# Patient Record
Sex: FEMALE | Race: WHITE | NOT HISPANIC OR LATINO | URBAN - METROPOLITAN AREA
[De-identification: names, ages, dates, MRNs, and addresses within clinical notes are randomized per-mention and may not be internally consistent; named-entity substitution may affect disease eponyms.]

---

## 2019-01-21 ENCOUNTER — EMERGENCY (EMERGENCY)
Facility: HOSPITAL | Age: 29
LOS: 1 days | Discharge: HOME | End: 2019-01-21
Attending: EMERGENCY MEDICINE
Payer: COMMERCIAL

## 2019-01-21 VITALS
OXYGEN SATURATION: 99 % | HEART RATE: 84 BPM | RESPIRATION RATE: 18 BRPM | DIASTOLIC BLOOD PRESSURE: 87 MMHG | TEMPERATURE: 98 F | SYSTOLIC BLOOD PRESSURE: 145 MMHG

## 2019-01-21 VITALS — WEIGHT: 188.05 LBS | HEIGHT: 72 IN

## 2019-01-21 PROCEDURE — 99284 EMERGENCY DEPT VISIT MOD MDM: CPT | Mod: 25

## 2019-01-21 PROCEDURE — 99284 EMERGENCY DEPT VISIT MOD MDM: CPT

## 2019-01-21 PROCEDURE — 70450 CT HEAD/BRAIN W/O DYE: CPT | Mod: 26

## 2019-01-21 PROCEDURE — 70450 CT HEAD/BRAIN W/O DYE: CPT

## 2019-01-21 RX ORDER — ACETAMINOPHEN 500 MG
650 TABLET ORAL ONCE
Qty: 0 | Refills: 0 | Status: COMPLETED | OUTPATIENT
Start: 2019-01-21 | End: 2019-01-21

## 2019-01-21 RX ADMIN — Medication 650 MILLIGRAM(S): at 19:18

## 2019-01-21 RX ADMIN — Medication 650 MILLIGRAM(S): at 18:41

## 2019-01-21 NOTE — ED ADULT NURSE NOTE - NSIMPLEMENTINTERV_GEN_ALL_ED
Implemented All Universal Safety Interventions:  [place] to call system. Call bell, personal items and telephone within reach. Instruct patient to call for assistance. Room bathroom lighting operational. Non-slip footwear when patient is off stretcher. Physically safe environment: no spills, clutter or unnecessary equipment. Stretcher in lowest position, wheels locked, appropriate side rails in place.
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
## 2019-01-21 NOTE — ED PROVIDER NOTE - PHYSICAL EXAMINATION
Afebrile, hemodynamically stable, saturating well  NAD, well appearing  Head NCAT  EOMI grossly, anicteric  MMM  No JVD  RRR, nml S1/S2, no m/r/g  Lungs CTAB, no w/r/r  Abd soft, NT, ND, nml BS, no rebound or guarding  AAO, CN's 3-12 grossly intact  No CTLS tenderness. Motor 5/5 and sensation intact in all extremities.  GOLDBERG spontaneously, no leg cyanosis or edema  Skin warm, well perfused, no rashes or hives Afebrile, hemodynamically stable, saturating well  NAD, well appearing  Head NCAT. No hemotympanum.   EOMI grossly, anicteric  MMM  No JVD  RRR, nml S1/S2, no m/r/g  Lungs CTAB, no w/r/r  Abd soft, NT, ND, nml BS, no rebound or guarding  AAO, CN's 3-12 grossly intact  No CTLS tenderness. Motor 5/5 and sensation intact in all extremities.  GOLDBERG spontaneously, no leg cyanosis or edema  Skin warm, well perfused, no rashes or hives Afebrile, hemodynamically stable, saturating well  NAD, well appearing  Head NCAT  EOMI grossly, anicteric  MMM, no hemotympanum  RRR, nml S1/S2, no m/r/g  Lungs CTAB, no w/r/r  Abd soft, NT, ND, nml BS, no rebound or guarding  AAO, CN's 3-12 grossly intact  No CTLS tenderness. Motor 5/5 and sensation intact in all extremities. Nml gait  GOLDBERG spontaneously, no leg cyanosis or edema  Skin warm, well perfused, no rashes or hives

## 2019-01-24 PROBLEM — Z00.00 ENCOUNTER FOR PREVENTIVE HEALTH EXAMINATION: Status: ACTIVE | Noted: 2019-01-24

## 2019-02-01 ENCOUNTER — APPOINTMENT (OUTPATIENT)
Dept: NEUROSURGERY | Facility: CLINIC | Age: 29
End: 2019-02-01

## 2019-02-25 ENCOUNTER — APPOINTMENT (OUTPATIENT)
Dept: NEUROLOGY | Facility: CLINIC | Age: 29
End: 2019-02-25

## 2019-03-04 ENCOUNTER — APPOINTMENT (OUTPATIENT)
Dept: PHYSICAL MEDICINE AND REHAB | Facility: CLINIC | Age: 29
End: 2019-03-04

## 2019-03-14 ENCOUNTER — APPOINTMENT (OUTPATIENT)
Dept: PHYSICAL MEDICINE AND REHAB | Facility: CLINIC | Age: 29
End: 2019-03-14

## 2019-03-15 ENCOUNTER — APPOINTMENT (OUTPATIENT)
Dept: PHYSICAL MEDICINE AND REHAB | Facility: CLINIC | Age: 29
End: 2019-03-15
Payer: COMMERCIAL

## 2019-03-15 VITALS
WEIGHT: 201 LBS | BODY MASS INDEX: 27.22 KG/M2 | DIASTOLIC BLOOD PRESSURE: 89 MMHG | HEART RATE: 82 BPM | OXYGEN SATURATION: 97 % | RESPIRATION RATE: 16 BRPM | SYSTOLIC BLOOD PRESSURE: 126 MMHG | HEIGHT: 72 IN

## 2019-03-15 DIAGNOSIS — S06.0X9A CONCUSSION WITH LOSS OF CONSCIOUSNESS OF UNSPECIFIED DURATION, INITIAL ENCOUNTER: ICD-10-CM

## 2019-03-15 DIAGNOSIS — Z80.3 FAMILY HISTORY OF MALIGNANT NEOPLASM OF BREAST: ICD-10-CM

## 2019-03-15 DIAGNOSIS — Z78.9 OTHER SPECIFIED HEALTH STATUS: ICD-10-CM

## 2019-03-15 DIAGNOSIS — H53.9 UNSPECIFIED VISUAL DISTURBANCE: ICD-10-CM

## 2019-03-15 DIAGNOSIS — H43.399 OTHER VITREOUS OPACITIES, UNSPECIFIED EYE: ICD-10-CM

## 2019-03-15 DIAGNOSIS — H53.19 OTHER SUBJECTIVE VISUAL DISTURBANCES: ICD-10-CM

## 2019-03-15 PROCEDURE — 99204 OFFICE O/P NEW MOD 45 MIN: CPT

## 2019-03-15 NOTE — PHYSICAL EXAM
[FreeTextEntry1] : PE: Pleasant O x 3 follows 1-2 step commands\par \par ext:\par no facial swelling or bruising\par eyes clear, EOMI\par no facial droop\par no cervical PS or trap spasm, tightness\par cervical ROM: FF 25 ext 20 \par right lateral flexion 25 left 25\par lateral rotation WFL \par negative Spurling's no pain with ROM\par \par bilateral Ue nad LE normal tone and ROM\par no pain with ROM\par motor 5/5 [place] shoulder, elbow flexion/extension, gross grasp\par bilateral Hf, quad ham 5/5 ankle PF and DF 5/5\par \par neuro: negative Romberg\par SLS 10 seconds bilaterally\par tandem gait good\par \par orientation: 5/5\par memory: 5/5 immediate recall 4/5 delayed 5 min (5/5 with cue) 5/5 after 20 min\par concentration: digits revers 3/4 (missed # on 6 span) months of year 100% reverse
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
## 2019-03-15 NOTE — SOCIAL HISTORY
[Apartment] : in an apartment [Stairs to enter?] : stairs to enter [Stairs inside the home?] : stairs inside the home [No Device Needed] : Patient doesn't use a device for ambulation [de-identified] : lives alone [FreeTextEntry1] : Patient lives in coop, stairs, 2 flights, [employer]ep[employer] PTA [FreeTextEntry6] : no difficulty with iADLs including bills, grocery shopping etc
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
## 2019-03-15 NOTE — SOCIAL HISTORY
[Apartment] : in an apartment [Stairs to enter?] : stairs to enter [Stairs inside the home?] : stairs inside the home [No Device Needed] : Patient doesn't use a device for ambulation [de-identified] : lives alone [FreeTextEntry1] : Patient lives in coop, stairs, 2 flights, [employer]ep[employer] PTA [FreeTextEntry6] : no difficulty with iADLs including bills, grocery shopping etc

## 2019-06-03 ENCOUNTER — APPOINTMENT (OUTPATIENT)
Dept: PHYSICAL MEDICINE AND REHAB | Facility: CLINIC | Age: 29
End: 2019-06-03

## 2019-10-09 ENCOUNTER — APPOINTMENT (OUTPATIENT)
Dept: ORTHOPEDIC SURGERY | Facility: CLINIC | Age: 29
End: 2019-10-09
Payer: COMMERCIAL

## 2019-10-09 DIAGNOSIS — M94.269 CHONDROMALACIA, UNSPECIFIED KNEE: ICD-10-CM

## 2019-10-09 PROCEDURE — 73562 X-RAY EXAM OF KNEE 3: CPT | Mod: 50

## 2019-10-09 PROCEDURE — 99203 OFFICE O/P NEW LOW 30 MIN: CPT

## 2019-10-09 NOTE — PHYSICAL EXAM
[de-identified] : X-ray bilateral knee. There is no significant bony abnormality arthritis or fracture Ellen Saeed  (RN)  2018 15:22:55 [de-identified] : Bilateral knee\par \par Constitutional: \par The patient is healthy-appearing and in no apparent distress. \par \par Gait:\par The patient ambulates with a normal gait and no limp.\par \par Cardiovascular System: \par The capillary refill is less than 2 seconds. \par \par Skin: \par There are no skin abnormalities.\par \par Bilateral Knee: \par \par Bony Palpation: \par There is no tenderness of the medial joint line. \par There is no tenderness of the lateral joint line.\par There is no tenderness of the medial femoral chondyle.\par There is no tenderness of the lateral femoral chondyle.\par There is no tenderness of the tibial tubercle.\par There is no tenderness of the superior patella.\par There is no tenderness of the inferior patella.\par There is tenderness of the medial patellar facet.\par There is tenderness of the lateral patellar facet.\par \par Soft Tissue Palpation: \par There is no tenderness of the medial retinaculum.\par There is no tenderness of the lateral retinaculum.\par There is no tenderness of the quadriceps tendon.\par There is no tenderness of the patella tendon.\par There is no tenderness of the ITB.\par There is no tenderness of the pes anserine.\par \par Active Range of Motion: \par The range of motion at the knee actively and passively is full. \par \par Special Tests: \par There is a negative Apley.\par There is a negative Steinmanns. \par There is a negative Lachman and Anterior Drawer.\par There is a negative Posterior Drawer.  \par There is no varus or valgus laxity.\par \par Strength: \par There is 4/5 hip flexion and 5/5 knee flexion and extension.  \par \par Psychiatric: \par The patient demonstrates a normal mood and affect and is active and alert.\par .

## 2019-10-09 NOTE — HISTORY OF PRESENT ILLNESS
[de-identified] : Patient reports pain in BOTH knees for about 10 years now. Aching pain. Pain with being the knees, squatting and using stairs. Previous history of tendonitis in both knees. Patient is an avid racer/runner. Occasional stiffness.

## 2019-10-09 NOTE — ASSESSMENT
[FreeTextEntry1] : Discussed with patient exam. Recommendation for home exercises. Offered physical therapy but patient declines at this time. Patient to follow up in 6-8 weeks if no improvement

## 2020-04-14 ENCOUNTER — TRANSCRIPTION ENCOUNTER (OUTPATIENT)
Age: 30
End: 2020-04-14

## 2022-12-06 ENCOUNTER — OUTPATIENT (OUTPATIENT)
Dept: OUTPATIENT SERVICES | Facility: HOSPITAL | Age: 32
LOS: 1 days | Discharge: HOME | End: 2022-12-06

## 2022-12-06 DIAGNOSIS — E83.119 HEMOCHROMATOSIS, UNSPECIFIED: ICD-10-CM

## 2022-12-17 ENCOUNTER — NON-APPOINTMENT (OUTPATIENT)
Age: 32
End: 2022-12-17

## 2023-01-17 ENCOUNTER — NON-APPOINTMENT (OUTPATIENT)
Age: 33
End: 2023-01-17

## 2023-01-17 ENCOUNTER — RESULT REVIEW (OUTPATIENT)
Age: 33
End: 2023-01-17

## 2023-01-17 ENCOUNTER — APPOINTMENT (OUTPATIENT)
Dept: HEMATOLOGY ONCOLOGY | Facility: CLINIC | Age: 33
End: 2023-01-17
Payer: COMMERCIAL

## 2023-01-17 ENCOUNTER — APPOINTMENT (OUTPATIENT)
Dept: HEMATOLOGY ONCOLOGY | Facility: CLINIC | Age: 33
End: 2023-01-17

## 2023-01-17 VITALS
TEMPERATURE: 97.7 F | HEART RATE: 78 BPM | RESPIRATION RATE: 16 BRPM | BODY MASS INDEX: 27.32 KG/M2 | SYSTOLIC BLOOD PRESSURE: 129 MMHG | OXYGEN SATURATION: 99 % | HEIGHT: 72 IN | DIASTOLIC BLOOD PRESSURE: 88 MMHG | WEIGHT: 201.72 LBS

## 2023-01-17 DIAGNOSIS — R79.0 ABNORMAL LVL OF BLOOD MINERAL: ICD-10-CM

## 2023-01-17 LAB
BASOPHILS # BLD AUTO: 0.02 K/UL — SIGNIFICANT CHANGE UP (ref 0–0.2)
BASOPHILS NFR BLD AUTO: 0.3 % — SIGNIFICANT CHANGE UP (ref 0–2)
EOSINOPHIL # BLD AUTO: 0.08 K/UL — SIGNIFICANT CHANGE UP (ref 0–0.5)
EOSINOPHIL NFR BLD AUTO: 1.3 % — SIGNIFICANT CHANGE UP (ref 0–6)
HCT VFR BLD CALC: 41.6 % — SIGNIFICANT CHANGE UP (ref 34.5–45)
HGB BLD-MCNC: 13.7 G/DL — SIGNIFICANT CHANGE UP (ref 11.5–15.5)
IMM GRANULOCYTES NFR BLD AUTO: 0.2 % — SIGNIFICANT CHANGE UP (ref 0–0.9)
LYMPHOCYTES # BLD AUTO: 1.84 K/UL — SIGNIFICANT CHANGE UP (ref 1–3.3)
LYMPHOCYTES # BLD AUTO: 30.1 % — SIGNIFICANT CHANGE UP (ref 13–44)
MCHC RBC-ENTMCNC: 31.3 PG — SIGNIFICANT CHANGE UP (ref 27–34)
MCHC RBC-ENTMCNC: 32.9 G/DL — SIGNIFICANT CHANGE UP (ref 32–36)
MCV RBC AUTO: 95 FL — SIGNIFICANT CHANGE UP (ref 80–100)
MONOCYTES # BLD AUTO: 0.58 K/UL — SIGNIFICANT CHANGE UP (ref 0–0.9)
MONOCYTES NFR BLD AUTO: 9.5 % — SIGNIFICANT CHANGE UP (ref 2–14)
NEUTROPHILS # BLD AUTO: 3.58 K/UL — SIGNIFICANT CHANGE UP (ref 1.8–7.4)
NEUTROPHILS NFR BLD AUTO: 58.6 % — SIGNIFICANT CHANGE UP (ref 43–77)
NRBC # BLD: 0 /100 WBCS — SIGNIFICANT CHANGE UP (ref 0–0)
PLATELET # BLD AUTO: 219 K/UL — SIGNIFICANT CHANGE UP (ref 150–400)
RBC # BLD: 4.38 M/UL — SIGNIFICANT CHANGE UP (ref 3.8–5.2)
RBC # FLD: 13.2 % — SIGNIFICANT CHANGE UP (ref 10.3–14.5)
RETICS #: 62.6 K/UL — SIGNIFICANT CHANGE UP (ref 25–125)
RETICS/RBC NFR: 1.4 % — SIGNIFICANT CHANGE UP (ref 0.5–2.5)
WBC # BLD: 6.11 K/UL — SIGNIFICANT CHANGE UP (ref 3.8–10.5)
WBC # FLD AUTO: 6.11 K/UL — SIGNIFICANT CHANGE UP (ref 3.8–10.5)

## 2023-01-17 PROCEDURE — 99204 OFFICE O/P NEW MOD 45 MIN: CPT

## 2023-01-17 RX ORDER — ETONOGESTREL 68 MG/1
IMPLANT SUBCUTANEOUS
Refills: 0 | Status: ACTIVE | COMMUNITY

## 2023-01-23 LAB
ALBUMIN SERPL ELPH-MCNC: 4.4 G/DL
ALP BLD-CCNC: 56 U/L
ALT SERPL-CCNC: 26 U/L
ANION GAP SERPL CALC-SCNC: 10 MMOL/L
AST SERPL-CCNC: 27 U/L
BILIRUB SERPL-MCNC: 0.7 MG/DL
BUN SERPL-MCNC: 19 MG/DL
CALCIUM SERPL-MCNC: 9.6 MG/DL
CHLORIDE SERPL-SCNC: 103 MMOL/L
CO2 SERPL-SCNC: 26 MMOL/L
CREAT SERPL-MCNC: 0.71 MG/DL
EGFR: 115 ML/MIN/1.73M2
FERRITIN SERPL-MCNC: 22 NG/ML
GLUCOSE SERPL-MCNC: 104 MG/DL
HAPTOGLOB SERPL-MCNC: 83 MG/DL
IRON SATN MFR SERPL: 17 %
IRON SERPL-MCNC: 64 UG/DL
LDH SERPL-CCNC: 202 U/L
POTASSIUM SERPL-SCNC: 4.4 MMOL/L
PROT SERPL-MCNC: 7 G/DL
SODIUM SERPL-SCNC: 140 MMOL/L
TIBC SERPL-MCNC: 373 UG/DL
TM INTERPRETATION: NORMAL
UIBC SERPL-MCNC: 308 UG/DL

## 2023-01-23 NOTE — ASSESSMENT
[FreeTextEntry1] : 32 year old woman with no significant past medical history here for evaluation of elevated iron levels.  Laboratory studies on 9/16/2022 showed Iron 257, TIBC 467 and Iron saturation 55.03%.  No Ferritin was noted. Recent CBC showed WBC 6.3, HGB 13.6, HCT 42.1, MCV 97.9 and ,000.  \par \par Plan:\par Will check CBC, CMP, iron studies, Ferritin and hemochromatosis gene analysis.  Will also check LDH, haptoglobin and reticulocyte count to rule out hemolysis. \par Patient advised to check current supplements to see if they contain iron. \par Will call patient with laboratory results when available. \par Patient seen and examined, case and management discussed with Dr. Brent Aviles. \par \par Attending Attestation:\par This is a 32 year old woman with no significant past medical history, patient presents for the evaluation of an elevated Iron saturation 55.3% No history of iron supplementation, no history of anemia.  No localizing signs or symptoms of inflammatory disease.  Patient had a one time measurement of 55.03%  Ferritin has not yet been run.  Would evaluate HFE mutation for C282Y and H63D mutation. Repeat iron saturation and check ferritin for total iron storage. It is possible that this was a one time spuriously high event. 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 This is a mild mutation that does not cause appreciable levels of iron overload or deposition, and is clinically insignificant. Patient ferritin incidentally normal 22ng/dl and % sat normalized to 17%.  Does not require additional hematologic follow up.  Can return if any new hematologic issues arise.

## 2023-01-23 NOTE — HISTORY OF PRESENT ILLNESS
[de-identified] : Initial Consultation on 2023\par Referred by: Dr. Gadiel Zurita\par CC: Elevated iron \par \par HPI:\par 32 year old woman with no significant past medical history here for evaluation of elevated iron levels.  Patient reports that iron levels have been elevated since .  Laboratory studies on 2022 showed Iron 257, TIBC 467 and Iron saturation 55.03%.  No Ferritin was noted. Recent CBC showed WBC 6.3, HGB 13.6, HCT 42.1, MCV 97.9 and ,000.  She is on multiple supplements including One A Day women's (which contains 18mg iron daily), and [employer] nutrition (which she is not sure what vitamins are included in it). She denies heavy menstrual bleeding. \par \par She feels good overall.  She reports a good energy level and appetite without recent weight loss.  She had influenza like illness for 3 weeks in 2022. She works out 3 hours per day.  She participates in marathons three times per year.  Patient denies any fever/chills, CP, SOB, abdominal pain, n/v/d, bloody/black stools, frequent headaches, dizziness, joint/back pain. \par \par PMHx:\par Denies\par \par PSHx:\par Rhinoplasty 16 years ago. \par \par Past OB/Gyn:\par  0 Para 0.  Denies TOP or miscarriages. \par Menses are irregular- comes approx monthly and lasts 4 days without heavy bleeding. \par \par Hospitalizations:\par Denies\par \par Medications:\par See List.  Medications reconciled\par Nexplanon implant\par Takes supplements including Zinc, Biotin, joint supplement, apple cider gummies, [employer] nutrition (personalized vitamin supplements) and one-a-day women's vitamins \par \par Allergies:\par NKDA\par \par Social History:\par Has a boyfriend- he is stationed in [employer] currently.  \par Works as a . \par Denies smoking\par Occasional alcohol use\par Born in [place] Parents are from [place]. \par Eats a regular diet. \par Works out 3 hours/ day. \par \par Family History:\par Mother alive, breast cancer s/p chemo/surgery- [facility]\par Father , brain infection, MI\par Brother alive, overweight\par Sister alive and well. \par Denies family history of hemochromatosis or liver disorders. \par \par Healthcare Maintenance:\par Primary care doctor- Dr. Zurita- last seen 2022\par Denies colonoscopy or endoscopy. \par Last mammogram- - normal\par Last gyn exam- 2022\par Received two doses of COVID vaccine\par History of COVID illness X 3- last 2021 [de-identified] : Initial Visit

## 2023-01-23 NOTE — PHYSICAL EXAM
[Fully active, able to carry on all pre-disease performance without restriction] : Status 0 - Fully active, able to carry on all pre-disease performance without restriction [Normal] : affect appropriate [de-identified] : No cervical, axillary or inguinal LAD noted

## 2023-01-25 ENCOUNTER — NON-APPOINTMENT (OUTPATIENT)
Age: 33
End: 2023-01-25

## 2023-01-27 ENCOUNTER — NON-APPOINTMENT (OUTPATIENT)
Age: 33
End: 2023-01-27

## 2023-11-20 ENCOUNTER — NON-APPOINTMENT (OUTPATIENT)
Age: 33
End: 2023-11-20

## 2023-12-26 ENCOUNTER — NON-APPOINTMENT (OUTPATIENT)
Age: 33
End: 2023-12-26

## 2024-01-11 ENCOUNTER — NON-APPOINTMENT (OUTPATIENT)
Age: 34
End: 2024-01-11

## 2024-04-18 ENCOUNTER — NON-APPOINTMENT (OUTPATIENT)
Age: 34
End: 2024-04-18

## 2024-11-19 ENCOUNTER — NON-APPOINTMENT (OUTPATIENT)
Age: 34
End: 2024-11-19

## 2024-12-30 ENCOUNTER — NON-APPOINTMENT (OUTPATIENT)
Age: 34
End: 2024-12-30

## 2025-04-14 ENCOUNTER — NON-APPOINTMENT (OUTPATIENT)
Age: 35
End: 2025-04-14

## 2025-06-30 ENCOUNTER — NON-APPOINTMENT (OUTPATIENT)
Age: 35
End: 2025-06-30